# Patient Record
Sex: MALE | Race: WHITE | NOT HISPANIC OR LATINO | URBAN - METROPOLITAN AREA
[De-identification: names, ages, dates, MRNs, and addresses within clinical notes are randomized per-mention and may not be internally consistent; named-entity substitution may affect disease eponyms.]

---

## 2024-04-17 ENCOUNTER — APPOINTMENT (RX ONLY)
Dept: URBAN - METROPOLITAN AREA CLINIC 146 | Facility: CLINIC | Age: 54
Setting detail: DERMATOLOGY
End: 2024-04-17

## 2024-04-17 DIAGNOSIS — L40.0 PSORIASIS VULGARIS: ICD-10-CM

## 2024-04-17 PROCEDURE — ? PRESCRIPTION MEDICATION MANAGEMENT

## 2024-04-17 PROCEDURE — ? COUNSELING

## 2024-04-17 PROCEDURE — ? PRESCRIPTION

## 2024-04-17 PROCEDURE — 99213 OFFICE O/P EST LOW 20 MIN: CPT

## 2024-04-17 RX ORDER — MOMETASONE FUROATE 1 MG/G
CREAM TOPICAL
Qty: 45 | Refills: 0 | Status: ERX | COMMUNITY
Start: 2024-04-17

## 2024-04-17 RX ADMIN — MOMETASONE FUROATE: 1 CREAM TOPICAL at 00:00

## 2024-04-17 NOTE — PROCEDURE: PRESCRIPTION MEDICATION MANAGEMENT
Initiate Treatment: mometasone 0.1 % topical cream \\nQuantity: 45.0 g  Days Supply: 30\\nSig: Apply to face bid
Detail Level: Zone
Render In Strict Bullet Format?: No

## 2024-05-08 ENCOUNTER — APPOINTMENT (RX ONLY)
Dept: URBAN - METROPOLITAN AREA CLINIC 146 | Facility: CLINIC | Age: 54
Setting detail: DERMATOLOGY
End: 2024-05-08

## 2024-05-08 DIAGNOSIS — L40.0 PSORIASIS VULGARIS: ICD-10-CM | Status: INADEQUATELY CONTROLLED

## 2024-05-08 PROCEDURE — ? COUNSELING

## 2024-05-08 PROCEDURE — 99214 OFFICE O/P EST MOD 30 MIN: CPT

## 2024-05-08 PROCEDURE — ? PRESCRIPTION MEDICATION MANAGEMENT

## 2024-05-08 ASSESSMENT — LOCATION SIMPLE DESCRIPTION DERM
LOCATION SIMPLE: LEFT CHEEK
LOCATION SIMPLE: LEFT EAR
LOCATION SIMPLE: LEFT ANTERIOR NECK
LOCATION SIMPLE: SCALP
LOCATION SIMPLE: RIGHT EAR

## 2024-05-08 ASSESSMENT — LOCATION ZONE DERM
LOCATION ZONE: SCALP
LOCATION ZONE: FACE
LOCATION ZONE: EAR
LOCATION ZONE: NECK

## 2024-05-08 ASSESSMENT — BSA PSORIASIS: % BODY COVERED IN PSORIASIS: 15

## 2024-05-08 ASSESSMENT — LOCATION DETAILED DESCRIPTION DERM
LOCATION DETAILED: LEFT ANTIHELIX
LOCATION DETAILED: LEFT CENTRAL MALAR CHEEK
LOCATION DETAILED: RIGHT SUPERIOR PARIETAL SCALP
LOCATION DETAILED: LEFT SUPERIOR ANTERIOR NECK
LOCATION DETAILED: RIGHT SUPERIOR CRUS OF ANTIHELIX

## 2024-05-08 ASSESSMENT — ITCH NUMERIC RATING SCALE: HOW SEVERE IS YOUR ITCHING?: 7

## 2024-05-08 NOTE — PROCEDURE: PRESCRIPTION MEDICATION MANAGEMENT
Samples Given: Vtama
Render In Strict Bullet Format?: No
Detail Level: Zone
Continue Regimen: Mometasone prn flares, clobetasol prn